# Patient Record
Sex: MALE | URBAN - METROPOLITAN AREA
[De-identification: names, ages, dates, MRNs, and addresses within clinical notes are randomized per-mention and may not be internally consistent; named-entity substitution may affect disease eponyms.]

---

## 2019-05-17 ENCOUNTER — INPATIENT (INPATIENT)
Facility: HOSPITAL | Age: 69
LOS: 2 days | Discharge: ANOTHER HOSPITAL | DRG: 282 | End: 2019-05-20
Attending: INTERNAL MEDICINE | Admitting: INTERNAL MEDICINE
Payer: COMMERCIAL

## 2019-05-17 VITALS
DIASTOLIC BLOOD PRESSURE: 69 MMHG | SYSTOLIC BLOOD PRESSURE: 147 MMHG | HEIGHT: 66 IN | HEART RATE: 61 BPM | OXYGEN SATURATION: 98 % | WEIGHT: 167.99 LBS | RESPIRATION RATE: 20 BRPM | TEMPERATURE: 98 F

## 2019-05-17 DIAGNOSIS — Z29.9 ENCOUNTER FOR PROPHYLACTIC MEASURES, UNSPECIFIED: ICD-10-CM

## 2019-05-17 DIAGNOSIS — E11.9 TYPE 2 DIABETES MELLITUS WITHOUT COMPLICATIONS: ICD-10-CM

## 2019-05-17 DIAGNOSIS — Z95.1 PRESENCE OF AORTOCORONARY BYPASS GRAFT: Chronic | ICD-10-CM

## 2019-05-17 DIAGNOSIS — I20.0 UNSTABLE ANGINA: ICD-10-CM

## 2019-05-17 DIAGNOSIS — R07.9 CHEST PAIN, UNSPECIFIED: ICD-10-CM

## 2019-05-17 DIAGNOSIS — I25.10 ATHEROSCLEROTIC HEART DISEASE OF NATIVE CORONARY ARTERY WITHOUT ANGINA PECTORIS: ICD-10-CM

## 2019-05-17 DIAGNOSIS — I10 ESSENTIAL (PRIMARY) HYPERTENSION: ICD-10-CM

## 2019-05-17 LAB
ALBUMIN SERPL ELPH-MCNC: 3.8 G/DL — SIGNIFICANT CHANGE UP (ref 3.5–5)
ALP SERPL-CCNC: 62 U/L — SIGNIFICANT CHANGE UP (ref 40–120)
ALT FLD-CCNC: 90 U/L DA — HIGH (ref 10–60)
ANION GAP SERPL CALC-SCNC: 7 MMOL/L — SIGNIFICANT CHANGE UP (ref 5–17)
AST SERPL-CCNC: 66 U/L — HIGH (ref 10–40)
BILIRUB SERPL-MCNC: 0.5 MG/DL — SIGNIFICANT CHANGE UP (ref 0.2–1.2)
BUN SERPL-MCNC: 16 MG/DL — SIGNIFICANT CHANGE UP (ref 7–18)
CALCIUM SERPL-MCNC: 9.1 MG/DL — SIGNIFICANT CHANGE UP (ref 8.4–10.5)
CHLORIDE SERPL-SCNC: 108 MMOL/L — SIGNIFICANT CHANGE UP (ref 96–108)
CO2 SERPL-SCNC: 25 MMOL/L — SIGNIFICANT CHANGE UP (ref 22–31)
CREAT SERPL-MCNC: 1.07 MG/DL — SIGNIFICANT CHANGE UP (ref 0.5–1.3)
GLUCOSE BLDC GLUCOMTR-MCNC: 107 MG/DL — HIGH (ref 70–99)
GLUCOSE BLDC GLUCOMTR-MCNC: 147 MG/DL — HIGH (ref 70–99)
GLUCOSE SERPL-MCNC: 102 MG/DL — HIGH (ref 70–99)
HCT VFR BLD CALC: 38.5 % — LOW (ref 39–50)
HGB BLD-MCNC: 12.9 G/DL — LOW (ref 13–17)
MCHC RBC-ENTMCNC: 30.1 PG — SIGNIFICANT CHANGE UP (ref 27–34)
MCHC RBC-ENTMCNC: 33.5 GM/DL — SIGNIFICANT CHANGE UP (ref 32–36)
MCV RBC AUTO: 89.7 FL — SIGNIFICANT CHANGE UP (ref 80–100)
NRBC # BLD: 0 /100 WBCS — SIGNIFICANT CHANGE UP (ref 0–0)
PLATELET # BLD AUTO: 175 K/UL — SIGNIFICANT CHANGE UP (ref 150–400)
POTASSIUM SERPL-MCNC: 4.8 MMOL/L — SIGNIFICANT CHANGE UP (ref 3.5–5.3)
POTASSIUM SERPL-SCNC: 4.8 MMOL/L — SIGNIFICANT CHANGE UP (ref 3.5–5.3)
PROT SERPL-MCNC: 7.4 G/DL — SIGNIFICANT CHANGE UP (ref 6–8.3)
RBC # BLD: 4.29 M/UL — SIGNIFICANT CHANGE UP (ref 4.2–5.8)
RBC # FLD: 12.5 % — SIGNIFICANT CHANGE UP (ref 10.3–14.5)
SODIUM SERPL-SCNC: 140 MMOL/L — SIGNIFICANT CHANGE UP (ref 135–145)
TROPONIN I SERPL-MCNC: 0.06 NG/ML — HIGH (ref 0–0.04)
TROPONIN I SERPL-MCNC: 0.21 NG/ML — HIGH (ref 0–0.04)
WBC # BLD: 9.07 K/UL — SIGNIFICANT CHANGE UP (ref 3.8–10.5)
WBC # FLD AUTO: 9.07 K/UL — SIGNIFICANT CHANGE UP (ref 3.8–10.5)

## 2019-05-17 PROCEDURE — 71046 X-RAY EXAM CHEST 2 VIEWS: CPT | Mod: 26

## 2019-05-17 PROCEDURE — 99285 EMERGENCY DEPT VISIT HI MDM: CPT

## 2019-05-17 RX ORDER — LISINOPRIL 2.5 MG/1
5 TABLET ORAL DAILY
Refills: 0 | Status: DISCONTINUED | OUTPATIENT
Start: 2019-05-17 | End: 2019-05-20

## 2019-05-17 RX ORDER — NIFEDIPINE 30 MG
30 TABLET, EXTENDED RELEASE 24 HR ORAL DAILY
Refills: 0 | Status: DISCONTINUED | OUTPATIENT
Start: 2019-05-17 | End: 2019-05-20

## 2019-05-17 RX ORDER — FUROSEMIDE 40 MG
40 TABLET ORAL DAILY
Refills: 0 | Status: DISCONTINUED | OUTPATIENT
Start: 2019-05-17 | End: 2019-05-20

## 2019-05-17 RX ORDER — CARVEDILOL PHOSPHATE 80 MG/1
25 CAPSULE, EXTENDED RELEASE ORAL EVERY 12 HOURS
Refills: 0 | Status: DISCONTINUED | OUTPATIENT
Start: 2019-05-17 | End: 2019-05-20

## 2019-05-17 RX ORDER — ISOSORBIDE MONONITRATE 60 MG/1
120 TABLET, EXTENDED RELEASE ORAL DAILY
Refills: 0 | Status: DISCONTINUED | OUTPATIENT
Start: 2019-05-17 | End: 2019-05-20

## 2019-05-17 RX ORDER — NITROGLYCERIN 6.5 MG
0.4 CAPSULE, EXTENDED RELEASE ORAL
Refills: 0 | Status: COMPLETED | OUTPATIENT
Start: 2019-05-17 | End: 2019-05-17

## 2019-05-17 RX ORDER — INSULIN LISPRO 100/ML
VIAL (ML) SUBCUTANEOUS
Refills: 0 | Status: DISCONTINUED | OUTPATIENT
Start: 2019-05-17 | End: 2019-05-20

## 2019-05-17 RX ORDER — TICAGRELOR 90 MG/1
60 TABLET ORAL
Refills: 0 | Status: DISCONTINUED | OUTPATIENT
Start: 2019-05-17 | End: 2019-05-20

## 2019-05-17 RX ORDER — DOCUSATE SODIUM 100 MG
100 CAPSULE ORAL DAILY
Refills: 0 | Status: DISCONTINUED | OUTPATIENT
Start: 2019-05-17 | End: 2019-05-20

## 2019-05-17 RX ORDER — FAMOTIDINE 10 MG/ML
20 INJECTION INTRAVENOUS
Refills: 0 | Status: DISCONTINUED | OUTPATIENT
Start: 2019-05-17 | End: 2019-05-20

## 2019-05-17 RX ORDER — ASPIRIN/CALCIUM CARB/MAGNESIUM 324 MG
81 TABLET ORAL DAILY
Refills: 0 | Status: DISCONTINUED | OUTPATIENT
Start: 2019-05-17 | End: 2019-05-20

## 2019-05-17 RX ORDER — ENOXAPARIN SODIUM 100 MG/ML
40 INJECTION SUBCUTANEOUS DAILY
Refills: 0 | Status: DISCONTINUED | OUTPATIENT
Start: 2019-05-17 | End: 2019-05-18

## 2019-05-17 RX ORDER — ATORVASTATIN CALCIUM 80 MG/1
40 TABLET, FILM COATED ORAL AT BEDTIME
Refills: 0 | Status: DISCONTINUED | OUTPATIENT
Start: 2019-05-17 | End: 2019-05-20

## 2019-05-17 RX ADMIN — Medication 0.4 MILLIGRAM(S): at 23:19

## 2019-05-17 RX ADMIN — Medication 0.4 MILLIGRAM(S): at 22:03

## 2019-05-17 RX ADMIN — ATORVASTATIN CALCIUM 40 MILLIGRAM(S): 80 TABLET, FILM COATED ORAL at 22:33

## 2019-05-17 RX ADMIN — Medication 0.4 MILLIGRAM(S): at 22:40

## 2019-05-17 NOTE — H&P ADULT - NSHPPHYSICALEXAM_GEN_ALL_CORE
General - NAD, sitting up in bed, well groomed  ENT - Nonicteric sclerae, PERRLA, EOMI. Oropharynx clear. Moist mucous membranes. Conjunctivae appear well perfused.   Neck - No noticeable or palpable swelling, redness or rash around throat or on face  Cardiovascular - RRR no JVD, no carotid bruits  Lungs - Clear to ascultation, no use of accessory muscles, no crackles or wheezes.  Skin - No rashes, skin warm and dry, no erythematous areas  Abdomen - Normal bowel sounds, abdomen soft and nontender  Extremities - No edema, cyanosis or clubbing  Musculo Skeletal - 5/5 strength, normal range of motion, no swollen or erythematous  joints.  Neurological – Alert and oriented x 3

## 2019-05-17 NOTE — H&P ADULT - PROBLEM SELECTOR PLAN 4
History of Coronary Artery Disease on Aspirin and Lipitor  - C/W Aspirin  - C/W Lipitor  - C/W Alanna

## 2019-05-17 NOTE — ED PROVIDER NOTE - CLINICAL SUMMARY MEDICAL DECISION MAKING FREE TEXT BOX
pt with hx of CAD, HTN< DM, HIGH CHOL with cp at rest while her for endosopy  will get cxr, labs, ekg, trop most likely admit

## 2019-05-17 NOTE — H&P ADULT - ASSESSMENT
68 y/o male from home with PMhx of HTN, HLD, CAD, DM, Unstable angina that was brought to the ED from endoscopy today in [facility] due to chest pain. Patient refers that the chest pain is on off does not radiate. This usually constant since the past few years and usually happens on exertion.  Denies any nausea vomiting, no fever or chills,  No  SOB, palpitations, dizziness, headache, cough, wheezing, joint pain or swelling, fever, chills.     Work up was done in the ED. Chest X-ray showing : Sternotomy wires noted. No consolidation or pleural effusion. Right upper   quadrant surgical clips present. Atelectasis lung bases.  Troponin elevated to 0.058. Patient comfortable not complaining of any current chest pain. 68 y/o male from home with PMhx of HTN, HLD, CAD, DM, Unstable angina that was brought to the ED from endoscopy today in [facility] due to chest pain. Patient refers that the chest pain is on off does not radiate. This usually constant since the past few years and usually happens on exertion.  Denies any nausea vomiting, no fever or chills,  No  SOB, palpitations, dizziness, headache, cough, wheezing, joint pain or swelling, fever, chills.     Work up was done in the ED. Chest X-ray showing : Sternotomy wires noted. No consolidation or pleural effusion. Right upper   quadrant surgical clips present. Atelectasis lung bases.  Troponin elevated to 0.058. Patient comfortable not complaining of any current chest pain.     Outpatient Cardiologist  [phone] Dr Hu

## 2019-05-17 NOTE — PATIENT PROFILE ADULT - STATED REASON FOR ADMISSION
Went for endoscopy and pt start having chest pain. Could not have the endo because of the chest pain. Pt was taking to the ED.

## 2019-05-17 NOTE — ED ADULT NURSE REASSESSMENT NOTE - NS ED NURSE REASSESS COMMENT FT1
Received pt form DIANA Blue, pt is observed sitting in bed, breathing room air, in no respiratory distress at time of assessment. Pt is A&O x3, able to make needs known, denies any distress/discomfort at this time. Pt ambulates on own, skin intact, right hand #18Ga in place and left AC #22 GA in place. No meds to be administered at this time. Admitted to [facility], on Box [address], sinus rhythm noted HR 65 at this time. Report given to DIANA Oliva by DIANA Juarez for [address]. Pt transported to floor on stretcher and monitor alongside son.

## 2019-05-17 NOTE — H&P ADULT - PROBLEM SELECTOR PLAN 2
-Patient takes at home Basaglar Insulin    -Continue with Insulin Sliding Scale   -Accucheck: Before meals and at bedtime

## 2019-05-17 NOTE — ED PROVIDER NOTE - PROGRESS NOTE DETAILS
Scribe HPI: 70 y/o M with a significant PMHx of  DM, HLD, HTN, and CABG and no significant PSHx presents to the ED with c/o L-sided chest pain that radiates to the L arm and neck x today. Pt states he was upstairs having an endoscopy done when the sxs began. Pt notes he is  currently asymptomatic in the ED. Pt denies diaphoresis, nausea, vomiting, or any other complaints. NKDA.     Scribe MDM: 70 y/o M with a PMHx of DM, HLD, HTN, and CABG presents to the ED with L-sided chest pain that radiates to the L arm and neck x today. Will check EKG, do cardiac w/u, and reassess. Pt has positive troponin, but unable to reach cardiologist to verify previous EKG. Will admit for unstable angina.

## 2019-05-17 NOTE — H&P ADULT - NSICDXPASTMEDICALHX_GEN_ALL_CORE_FT
PAST MEDICAL HISTORY:  CAD (coronary artery disease)     Diabetes     HLD (hyperlipidemia)     Hypertension

## 2019-05-17 NOTE — H&P ADULT - PROBLEM SELECTOR PLAN 5
IMPROVE VTE score: 2  Will manage with: Lovenox S/C    [ ] Previous VTE                                    3  [ ] Thrombophilia                                  2  [ ] Lower limb paralysis                        2  (unable to hold up >15 seconds)    [ ] Current Cancer (within 6 months)        2   [x] Immobilization > 24 hrs                    1  [ ] ICU/CCU stay > 24 hrs                      1  [x] Age > 60                                         1

## 2019-05-17 NOTE — H&P ADULT - PROBLEM SELECTOR PLAN 1
-EKG showed RBBB  Management:  - ASA, Lipitor + BB  - Check Troponins: T1: 0.058 T2: 0.211 and T3  - Telemetry monitoring  - TTE ordered  - Cardiology consult Dr Quintanilla -EKG showed RBBB. No ST segment elevations.   Management:  - ASA, Lipitor + BB  - Check Troponins: T1: 0.058 T2: 0.211 and T3  - Telemetry monitoring  - TTE ordered  - Cardiology consult Dr Quintanilla -EKG showed RBBB. No ST segment elevations.   - Will manage as NSTEMI  Management:  - ASA, Lipitor + BB  - Check Troponins: T1: 0.058 T2: 0.211 and T3: 0.8  - Started on Heparin Drip  - Telemetry monitoring  - TTE ordered  - Cardiology consult Dr Salinas

## 2019-05-17 NOTE — ED ADULT NURSE NOTE - NSIMPLEMENTINTERV_GEN_ALL_ED
Implemented All Universal Safety Interventions:  [place] to call system. Call bell, personal items and telephone within reach. Instruct patient to call for assistance. Room bathroom lighting operational. Non-slip footwear when patient is off stretcher. Physically safe environment: no spills, clutter or unnecessary equipment. Stretcher in lowest position, wheels locked, appropriate side rails in place.

## 2019-05-17 NOTE — H&P ADULT - HISTORY OF PRESENT ILLNESS
68 y/o male from home with PMhx of HTN, HLD, CAD, DM, Unstable angina that was brought to the ED from endoscopy today in [facility] due to chest pain. Patient refers that the chest pain is on off does not radiate. This usually constant since the past few years and usually happens on exertion.  Denies any nausea vomiting, no fever or chills,  No  SOB, palpitations, dizziness, headache, cough, wheezing, joint pain or swelling, fever, chills. 70 y/o male from home with PMHx of HTN, HLD, CAD, DM, Unstable angina that was brought to the ED from endoscopy today in [facility] due to chest pain. Patient refers that the chest pain is on off does not radiate. This usually constant since the past few years and usually happens on exertion.  Denies any nausea vomiting, no fever or chills,  No  SOB, palpitations, dizziness, headache, cough, wheezing, joint pain or swelling, fever, chills.

## 2019-05-18 DIAGNOSIS — I21.4 NON-ST ELEVATION (NSTEMI) MYOCARDIAL INFARCTION: ICD-10-CM

## 2019-05-18 LAB
APTT BLD: 132 SEC — CRITICAL HIGH (ref 27.5–36.3)
APTT BLD: 178.9 SEC — CRITICAL HIGH (ref 27.5–36.3)
APTT BLD: 43.4 SEC — HIGH (ref 27.5–36.3)
CK MB BLD-MCNC: 10.2 % — HIGH (ref 0–3.5)
CK MB BLD-MCNC: 14.2 % — HIGH (ref 0–3.5)
CK MB CFR SERPL CALC: 11.8 NG/ML — HIGH (ref 0–3.6)
CK MB CFR SERPL CALC: 22.8 NG/ML — HIGH (ref 0–3.6)
CK SERPL-CCNC: 116 U/L — SIGNIFICANT CHANGE UP (ref 35–232)
CK SERPL-CCNC: 160 U/L — SIGNIFICANT CHANGE UP (ref 35–232)
GLUCOSE BLDC GLUCOMTR-MCNC: 119 MG/DL — HIGH (ref 70–99)
GLUCOSE BLDC GLUCOMTR-MCNC: 138 MG/DL — HIGH (ref 70–99)
GLUCOSE BLDC GLUCOMTR-MCNC: 150 MG/DL — HIGH (ref 70–99)
GLUCOSE BLDC GLUCOMTR-MCNC: 205 MG/DL — HIGH (ref 70–99)
HCT VFR BLD CALC: 44.2 % — SIGNIFICANT CHANGE UP (ref 39–50)
HCV AB S/CO SERPL IA: 0.08 S/CO — SIGNIFICANT CHANGE UP (ref 0–0.99)
HCV AB SERPL-IMP: SIGNIFICANT CHANGE UP
HGB BLD-MCNC: 14.5 G/DL — SIGNIFICANT CHANGE UP (ref 13–17)
INR BLD: 1.18 RATIO — HIGH (ref 0.88–1.16)
MCHC RBC-ENTMCNC: 29.7 PG — SIGNIFICANT CHANGE UP (ref 27–34)
MCHC RBC-ENTMCNC: 32.8 GM/DL — SIGNIFICANT CHANGE UP (ref 32–36)
MCV RBC AUTO: 90.4 FL — SIGNIFICANT CHANGE UP (ref 80–100)
NRBC # BLD: 0 /100 WBCS — SIGNIFICANT CHANGE UP (ref 0–0)
PLATELET # BLD AUTO: 198 K/UL — SIGNIFICANT CHANGE UP (ref 150–400)
PROTHROM AB SERPL-ACNC: 13.2 SEC — HIGH (ref 10–12.9)
RBC # BLD: 4.89 M/UL — SIGNIFICANT CHANGE UP (ref 4.2–5.8)
RBC # FLD: 12.7 % — SIGNIFICANT CHANGE UP (ref 10.3–14.5)
TROPONIN I SERPL-MCNC: 0.87 NG/ML — HIGH (ref 0–0.04)
TROPONIN I SERPL-MCNC: 10.9 NG/ML — HIGH (ref 0–0.04)
TROPONIN I SERPL-MCNC: 13.5 NG/ML — HIGH (ref 0–0.04)
TROPONIN I SERPL-MCNC: 7.21 NG/ML — HIGH (ref 0–0.04)
WBC # BLD: 8.21 K/UL — SIGNIFICANT CHANGE UP (ref 3.8–10.5)
WBC # FLD AUTO: 8.21 K/UL — SIGNIFICANT CHANGE UP (ref 3.8–10.5)

## 2019-05-18 RX ORDER — HEPARIN SODIUM 5000 [USP'U]/ML
INJECTION INTRAVENOUS; SUBCUTANEOUS
Qty: 25000 | Refills: 0 | Status: DISCONTINUED | OUTPATIENT
Start: 2019-05-18 | End: 2019-05-20

## 2019-05-18 RX ORDER — MORPHINE SULFATE 50 MG/1
2 CAPSULE, EXTENDED RELEASE ORAL ONCE
Refills: 0 | Status: DISCONTINUED | OUTPATIENT
Start: 2019-05-18 | End: 2019-05-18

## 2019-05-18 RX ADMIN — HEPARIN SODIUM 800 UNIT(S)/HR: 5000 INJECTION INTRAVENOUS; SUBCUTANEOUS at 20:38

## 2019-05-18 RX ADMIN — ISOSORBIDE MONONITRATE 120 MILLIGRAM(S): 60 TABLET, EXTENDED RELEASE ORAL at 11:34

## 2019-05-18 RX ADMIN — FAMOTIDINE 20 MILLIGRAM(S): 10 INJECTION INTRAVENOUS at 17:40

## 2019-05-18 RX ADMIN — CARVEDILOL PHOSPHATE 25 MILLIGRAM(S): 80 CAPSULE, EXTENDED RELEASE ORAL at 17:40

## 2019-05-18 RX ADMIN — TICAGRELOR 60 MILLIGRAM(S): 90 TABLET ORAL at 05:57

## 2019-05-18 RX ADMIN — MORPHINE SULFATE 2 MILLIGRAM(S): 50 CAPSULE, EXTENDED RELEASE ORAL at 01:32

## 2019-05-18 RX ADMIN — LISINOPRIL 5 MILLIGRAM(S): 2.5 TABLET ORAL at 05:57

## 2019-05-18 RX ADMIN — FAMOTIDINE 20 MILLIGRAM(S): 10 INJECTION INTRAVENOUS at 05:57

## 2019-05-18 RX ADMIN — Medication 100 MILLIGRAM(S): at 11:34

## 2019-05-18 RX ADMIN — Medication 30 MILLIGRAM(S): at 05:57

## 2019-05-18 RX ADMIN — CARVEDILOL PHOSPHATE 25 MILLIGRAM(S): 80 CAPSULE, EXTENDED RELEASE ORAL at 05:57

## 2019-05-18 RX ADMIN — MORPHINE SULFATE 2 MILLIGRAM(S): 50 CAPSULE, EXTENDED RELEASE ORAL at 02:04

## 2019-05-18 RX ADMIN — Medication 81 MILLIGRAM(S): at 11:34

## 2019-05-18 RX ADMIN — HEPARIN SODIUM 0 UNIT(S)/HR: 5000 INJECTION INTRAVENOUS; SUBCUTANEOUS at 19:29

## 2019-05-18 RX ADMIN — ATORVASTATIN CALCIUM 40 MILLIGRAM(S): 80 TABLET, FILM COATED ORAL at 22:47

## 2019-05-18 RX ADMIN — TICAGRELOR 60 MILLIGRAM(S): 90 TABLET ORAL at 17:40

## 2019-05-18 RX ADMIN — HEPARIN SODIUM 1100 UNIT(S)/HR: 5000 INJECTION INTRAVENOUS; SUBCUTANEOUS at 12:35

## 2019-05-18 RX ADMIN — Medication 40 MILLIGRAM(S): at 05:57

## 2019-05-18 RX ADMIN — HEPARIN SODIUM 1400 UNIT(S)/HR: 5000 INJECTION INTRAVENOUS; SUBCUTANEOUS at 03:28

## 2019-05-18 RX ADMIN — Medication 4: at 11:52

## 2019-05-18 RX ADMIN — HEPARIN SODIUM 0 UNIT(S)/HR: 5000 INJECTION INTRAVENOUS; SUBCUTANEOUS at 11:34

## 2019-05-18 NOTE — CONSULT NOTE ADULT - ATTENDING COMMENTS
Patient was seen and examined,interim events noted,labs and radiology studies reviewed.  Lyle Salinas MD,FACC.  [address] [place] [address].  [id number].  [phone]

## 2019-05-18 NOTE — CONSULT NOTE ADULT - ASSESSMENT
70 y/o male from home with PMhx of HTN, HLD, CAD, DM, Unstable angina that was brought to the ED from endoscopy today in [facility] due to chest pain.70 y/o male from home with PMhx of HTN, HLD, CAD, DM, Unstable angina that was brought to the ED from endoscopy today in [facility] due to chest pain.    Problem/Plan - 1:  ·  Problem: Chest pain ACS-NSTEMI  Plan: -EKG showed RBBB. No ST segment elevations.   Continue Heparin gtt  Will need cath        Problem/Plan - 2:  ·  Problem: Diabetes.  Plan: -Patient takes at home Basaglar Insulin    -Continue with Insulin Sliding Scale   -Accucheck: Before meals and at bedtime.     Problem/Plan - 3:  ·  Problem: Hypertension.  Plan: C/W Home BP medications.     Problem/Plan - 4:  ·  Problem: CAD (coronary artery disease).  Plan: History of Coronary Artery Disease on Aspirin and Lipitor  - C/W Aspirin  - C/W Lipitor  - C/W Alanna.

## 2019-05-18 NOTE — PROGRESS NOTE ADULT - SUBJECTIVE AND OBJECTIVE BOX
PGY1 Note discussed with supervising resident and primary attending.    Patient is a 69y old  Male who presents with a chief complaint of Chest Pain (17 May 2019 17:59)      INTERVAL HPI/OVERNIGHT EVENTS:    Mr. Gibbs is seen at the bedside. Denies chest pain. Cardiac enzyme spike noted this AM. Discussed with cardio - will continue heparin gtt. EKG this AM unchanged.    MEDICATIONS  (STANDING):  aspirin  chewable 81 milliGRAM(s) Oral daily  atorvastatin 40 milliGRAM(s) Oral at bedtime  carvedilol 25 milliGRAM(s) Oral every 12 hours  docusate sodium 100 milliGRAM(s) Oral daily  famotidine    Tablet 20 milliGRAM(s) Oral two times a day  furosemide    Tablet 40 milliGRAM(s) Oral daily  heparin  Infusion.  Unit(s)/Hr (14 mL/Hr) IV Continuous <Continuous>  insulin lispro (HumaLOG) corrective regimen sliding scale   SubCutaneous Before meals and at bedtime  isosorbide   mononitrate ER Tablet (IMDUR) 120 milliGRAM(s) Oral daily  lisinopril 5 milliGRAM(s) Oral daily  NIFEdipine XL 30 milliGRAM(s) Oral daily  ticagrelor 60 milliGRAM(s) Oral two times a day    MEDICATIONS  (PRN):      Allergies    No Known Allergies    Intolerances        REVIEW OF SYSTEMS:  CONSTITUTIONAL: No fever, weight loss, or fatigue  RESPIRATORY: No cough, wheezing, chills or hemoptysis; No shortness of breath  CARDIOVASCULAR: No chest pain, palpitations, dizziness, or leg swelling  GASTROINTESTINAL: No abdominal or epigastric pain. No nausea, vomiting, or hematemesis; No diarrhea or constipation. No melena or hematochezia.  NEUROLOGICAL: No headaches, memory loss, loss of strength, numbness, or tremors  SKIN: No itching, burning, rashes, or lesions     Vital Signs Last 24 Hrs  T(C): 36.5 (18 May 2019 07:28), Max: 37.2 (17 May 2019 15:26)  T(F): 97.7 (18 May 2019 07:28), Max: 99 (17 May 2019 15:26)  HR: 56 (18 May 2019 07:28) (56 - 71)  BP: 118/80 (18 May 2019 07:28) (118/80 - 152/68)  BP(mean): --  RR: 16 (18 May 2019 07:28) (16 - 20)  SpO2: 100% (18 May 2019 07:28) (98% - 100%)    PHYSICAL EXAM:  GENERAL: NAD  HEAD:  Atraumatic, Normocephalic  EYES: EOMI, PERRLA, conjunctiva and sclera clear  NECK: Supple, No JVD, Normal thyroid  CHEST/LUNG: Clear to percussion bilaterally; No rales, rhonchi, wheezing, or rubs  HEART: Regular rate and rhythm; No murmurs, rubs, or gallops  ABDOMEN: Soft, Nontender, Nondistended; Bowel sounds present  NERVOUS SYSTEM:  Alert & Oriented X3, Good concentration; Motor Strength 5/5 B/L   EXTREMITIES:  2+ Peripheral Pulses, No clubbing, cyanosis, or edema    LABS:                        14.5   8.21  )-----------( 198      ( 18 May 2019 10:06 )             44.2     05-17    140  |  108  |  16  ----------------------------<  102<H>  4.8   |  25  |  1.07    Ca    9.1      17 May 2019 12:25    TPro  7.4  /  Alb  3.8  /  TBili  0.5  /  DBili  x   /  AST  66<H>  /  ALT  90<H>  /  AlkPhos  62  05-17    PT/INR - ( 18 May 2019 02:53 )   PT: 13.2 sec;   INR: 1.18 ratio         PTT - ( 18 May 2019 02:53 )  PTT:43.4 sec    CAPILLARY BLOOD GLUCOSE      POCT Blood Glucose.: 150 mg/dL (18 May 2019 07:47)  POCT Blood Glucose.: 107 mg/dL (17 May 2019 21:33)  POCT Blood Glucose.: 147 mg/dL (17 May 2019 16:37)      RADIOLOGY & ADDITIONAL TESTS:    Imaging Personally Reviewed:  [ X] YES  [ ] NO    Consultant(s) Notes Reviewed:  [X ] YES  [ ] NO

## 2019-05-18 NOTE — PROGRESS NOTE ADULT - ASSESSMENT
68 y/o male from home with PMhx of HTN, HLD, CAD, DM, Unstable angina that was brought to the ED from endoscopy today in [facility] due to chest pain. Patient refers that the chest pain is on off does not radiate. This usually constant since the past few years and usually happens on exertion.  Denies any nausea vomiting, no fever or chills,  No  SOB, palpitations, dizziness, headache, cough, wheezing, joint pain or swelling, fever, chills.     Outpatient Cardiologist   Dr Hu

## 2019-05-18 NOTE — CONSULT NOTE ADULT - SUBJECTIVE AND OBJECTIVE BOX
CHIEF COMPLAINT:Chest pain    HPI:-68 y/o male from home with PMHx of HTN, HLD, CAD, T2DM, Unstable Angina that was brought to the ED from endoscopy today in [facility] due to chest pain. Patient refers that the chest pain is on off does not radiate. This usually constant since the past few years and usually happens on exertion.  Denies any nausea vomiting, no fever or chills,  No  SOB, palpitations, dizziness, headache, cough, wheezing, joint pain or swelling, fever, chills.     PAST MEDICAL & SURGICAL HISTORY:  HLD (hyperlipidemia)  CAD (coronary artery disease)  Diabetes  Hypertension  S/P CABG (coronary artery bypass graft)      MEDICATIONS  (STANDING):  aspirin  chewable 81 milliGRAM(s) Oral daily  atorvastatin 40 milliGRAM(s) Oral at bedtime  carvedilol 25 milliGRAM(s) Oral every 12 hours  docusate sodium 100 milliGRAM(s) Oral daily  famotidine    Tablet 20 milliGRAM(s) Oral two times a day  furosemide    Tablet 40 milliGRAM(s) Oral daily  heparin  Infusion.  Unit(s)/Hr (14 mL/Hr) IV Continuous <Continuous>  insulin lispro (HumaLOG) corrective regimen sliding scale   SubCutaneous Before meals and at bedtime  isosorbide   mononitrate ER Tablet (IMDUR) 120 milliGRAM(s) Oral daily  lisinopril 5 milliGRAM(s) Oral daily  NIFEdipine XL 30 milliGRAM(s) Oral daily  ticagrelor 60 milliGRAM(s) Oral two times a day    MEDICATIONS  (PRN):      FAMILY HISTORY:  No pertinent family history in first degree relatives    No family history of premature coronary artery disease or sudden cardiac death    SOCIAL HISTORY:  Smoking-Former Smoker  Alcohol-Denies  Ilicit Drug use-Denies    REVIEW OF SYSTEMS:  Constitutional: [ ] fever, [ ]weight loss, [ ]fatigue Activity [ ] Bedbound,[ ] Ambulates [ ] Unassisted[ ] Cane/Walker [ ] Assistence.  Eyes: [ ] visual changes  Respiratory: [ ]shortness of breath;  [ ] cough, [ ]wheezing, [ ]chills, [ ]hemoptysis  Cardiovascular: [x ] chest pain, [ ]palpitations, [ ]dizziness,  [ ]leg swelling[ ]orthopnea [ ]PND  Gastrointestinal: [ ] abdominal pain, [ ]nausea, [ ]vomiting,  [ ]diarrhea,[ ]constipation  Genitourinary: [ ] dysuria, [ ] hematuria  Neurologic: [ ] headaches [ ] tremors[ ] weakness  Skin: [ ] itching, [ ]burning, [ ] rashes  Endocrine: [ ] heat or cold intolerance  Musculoskeletal: [ ] joint pain or swelling; [ ] muscle, back, or extremity pain  Psychiatric: [ ] depression, [ ]anxiety, [ ]mood swings, or [ ]difficulty sleeping  Hematologic: [ ] easy bruising, [ ] bleeding gums       [ x] All others negative	  [ ] Unable to obtain    Vital Signs Last 24 Hrs  T(C): 37.1 (18 May 2019 11:23), Max: 37.2 (17 May 2019 15:26)  T(F): 98.8 (18 May 2019 11:23), Max: 99 (17 May 2019 15:26)  HR: 66 (18 May 2019 11:23) (56 - 71)  BP: 118/54 (18 May 2019 11:23) (118/54 - 152/68)  RR: 16 (18 May 2019 11:23) (16 - 20)  SpO2: 100% (18 May 2019 11:23) (98% - 100%)  I&O's Summary      PHYSICAL EXAM:  General: No acute distress BMI-27.1  HEENT: EOMI, PERRL[ ] Icteric  Neck: Supple, No JVD  Lungs: Equal air entry bilaterally; [ ] Rales [x ] Rhonchi [ ] Wheezing  Heart: Regular rate and rhythm;[x ] Murmurs-  2 /6 [x ] Systolic [ ] Diastolic [ ] Radiation,No rubs, or gallops  Abdomen: Nontender, bowel sounds present  Extremities: No clubbing, cyanosis, or edema[ ] Calf tenderness  Nervous system:  Alert & Oriented X3, no focal deficits  Psychiatric: Normal affect  Skin: No rashes or lesions      LABS:  05-17    140  |  108  |  16  ----------------------------<  102<H>  4.8   |  25  |  1.07    Ca    9.1      17 May 2019 12:25    TPro  7.4  /  Alb  3.8  /  TBili  0.5  /  DBili  x   /  AST  66<H>  /  ALT  90<H>  /  AlkPhos  62  05-17    Creatinine Trend: 1.07<--                        14.5   8.21  )-----------( 198      ( 18 May 2019 10:06 )             44.2     PT/INR - ( 18 May 2019 02:53 )   PT: 13.2 sec;   INR: 1.18 ratio         PTT - ( 18 May 2019 10:44 )  PTT:178.9 sec    Lipid Panel:   Cardiac Enzymes: CARDIAC MARKERS ( 18 May 2019 07:06 )  10.900 ng/mL / x     / x     / x     / x      CARDIAC MARKERS ( 18 May 2019 01:29 )  0.873 ng/mL / x     / x     / x     / x      CARDIAC MARKERS ( 17 May 2019 18:05 )  0.211 ng/mL / x     / x     / x     / x      CARDIAC MARKERS ( 17 May 2019 12:25 )  0.058 ng/mL / x     / x     / x     / x            ECG [my interpretation]: Normal sinus rhythm  at 65 BPM  Incomplete right bundle branch block  Cannot rule out Anterior infarct , age undetermined  Marked ST abnormality, possible lateral subendocardial injury  Abnormal ECG

## 2019-05-18 NOTE — PROGRESS NOTE ADULT - PROBLEM SELECTOR PLAN 1
EKG showed RBBB without ST or T wave abnormalities   c/w asa, statin, Brilinta and Coreg  tropnin this AM nick to 10.9 - EKG unchanged and pt asymptomatic  c/w heparin gtt  c/w tele monitoring  echo shows mildly reduced EF (50-55%)  will await recent stress report   continue current management as per cardio  cardio - Dr. Salinas

## 2019-05-19 LAB
ANION GAP SERPL CALC-SCNC: 9 MMOL/L — SIGNIFICANT CHANGE UP (ref 5–17)
APTT BLD: 73.8 SEC — HIGH (ref 27.5–36.3)
APTT BLD: 74.6 SEC — HIGH (ref 27.5–36.3)
BASOPHILS # BLD AUTO: 0 K/UL — SIGNIFICANT CHANGE UP (ref 0–0.2)
BASOPHILS NFR BLD AUTO: 0 % — SIGNIFICANT CHANGE UP (ref 0–2)
BUN SERPL-MCNC: 20 MG/DL — HIGH (ref 7–18)
CALCIUM SERPL-MCNC: 9 MG/DL — SIGNIFICANT CHANGE UP (ref 8.4–10.5)
CHLORIDE SERPL-SCNC: 103 MMOL/L — SIGNIFICANT CHANGE UP (ref 96–108)
CHOLEST SERPL-MCNC: 122 MG/DL — SIGNIFICANT CHANGE UP (ref 10–199)
CK MB BLD-MCNC: 6.8 % — HIGH (ref 0–3.5)
CK MB CFR SERPL CALC: 6.3 NG/ML — HIGH (ref 0–3.6)
CK SERPL-CCNC: 92 U/L — SIGNIFICANT CHANGE UP (ref 35–232)
CO2 SERPL-SCNC: 25 MMOL/L — SIGNIFICANT CHANGE UP (ref 22–31)
CREAT SERPL-MCNC: 1.23 MG/DL — SIGNIFICANT CHANGE UP (ref 0.5–1.3)
EOSINOPHIL # BLD AUTO: 0.22 K/UL — SIGNIFICANT CHANGE UP (ref 0–0.5)
EOSINOPHIL NFR BLD AUTO: 3 % — SIGNIFICANT CHANGE UP (ref 0–6)
FOLATE SERPL-MCNC: 18.5 NG/ML — SIGNIFICANT CHANGE UP
GLUCOSE BLDC GLUCOMTR-MCNC: 120 MG/DL — HIGH (ref 70–99)
GLUCOSE BLDC GLUCOMTR-MCNC: 155 MG/DL — HIGH (ref 70–99)
GLUCOSE BLDC GLUCOMTR-MCNC: 163 MG/DL — HIGH (ref 70–99)
GLUCOSE BLDC GLUCOMTR-MCNC: 186 MG/DL — HIGH (ref 70–99)
GLUCOSE SERPL-MCNC: 140 MG/DL — HIGH (ref 70–99)
HCT VFR BLD CALC: 41.2 % — SIGNIFICANT CHANGE UP (ref 39–50)
HDLC SERPL-MCNC: 36 MG/DL — LOW
HGB BLD-MCNC: 14.1 G/DL — SIGNIFICANT CHANGE UP (ref 13–17)
LIPID PNL WITH DIRECT LDL SERPL: 59 MG/DL — SIGNIFICANT CHANGE UP
LYMPHOCYTES # BLD AUTO: 1.49 K/UL — SIGNIFICANT CHANGE UP (ref 1–3.3)
LYMPHOCYTES # BLD AUTO: 20 % — SIGNIFICANT CHANGE UP (ref 13–44)
MAGNESIUM SERPL-MCNC: 1.9 MG/DL — SIGNIFICANT CHANGE UP (ref 1.6–2.6)
MCHC RBC-ENTMCNC: 30.1 PG — SIGNIFICANT CHANGE UP (ref 27–34)
MCHC RBC-ENTMCNC: 34.2 GM/DL — SIGNIFICANT CHANGE UP (ref 32–36)
MCV RBC AUTO: 87.8 FL — SIGNIFICANT CHANGE UP (ref 80–100)
MONOCYTES # BLD AUTO: 1.56 K/UL — HIGH (ref 0–0.9)
MONOCYTES NFR BLD AUTO: 21 % — HIGH (ref 2–14)
NEUTROPHILS # BLD AUTO: 4.17 K/UL — SIGNIFICANT CHANGE UP (ref 1.8–7.4)
NEUTROPHILS NFR BLD AUTO: 56 % — SIGNIFICANT CHANGE UP (ref 43–77)
PHOSPHATE SERPL-MCNC: 3 MG/DL — SIGNIFICANT CHANGE UP (ref 2.5–4.5)
PLATELET # BLD AUTO: 166 K/UL — SIGNIFICANT CHANGE UP (ref 150–400)
POTASSIUM SERPL-MCNC: 3.6 MMOL/L — SIGNIFICANT CHANGE UP (ref 3.5–5.3)
POTASSIUM SERPL-SCNC: 3.6 MMOL/L — SIGNIFICANT CHANGE UP (ref 3.5–5.3)
RBC # BLD: 4.69 M/UL — SIGNIFICANT CHANGE UP (ref 4.2–5.8)
RBC # FLD: 12.5 % — SIGNIFICANT CHANGE UP (ref 10.3–14.5)
SODIUM SERPL-SCNC: 137 MMOL/L — SIGNIFICANT CHANGE UP (ref 135–145)
TOTAL CHOLESTEROL/HDL RATIO MEASUREMENT: 3.4 RATIO — SIGNIFICANT CHANGE UP (ref 3.4–9.6)
TRIGL SERPL-MCNC: 134 MG/DL — SIGNIFICANT CHANGE UP (ref 10–149)
TROPONIN I SERPL-MCNC: 4.51 NG/ML — HIGH (ref 0–0.04)
TSH SERPL-MCNC: 0.86 UU/ML — SIGNIFICANT CHANGE UP (ref 0.34–4.82)
VIT B12 SERPL-MCNC: 634 PG/ML — SIGNIFICANT CHANGE UP (ref 232–1245)
WBC # BLD: 7.44 K/UL — SIGNIFICANT CHANGE UP (ref 3.8–10.5)
WBC # FLD AUTO: 7.44 K/UL — SIGNIFICANT CHANGE UP (ref 3.8–10.5)

## 2019-05-19 RX ADMIN — Medication 2: at 08:08

## 2019-05-19 RX ADMIN — Medication 2: at 12:29

## 2019-05-19 RX ADMIN — ATORVASTATIN CALCIUM 40 MILLIGRAM(S): 80 TABLET, FILM COATED ORAL at 21:36

## 2019-05-19 RX ADMIN — TICAGRELOR 60 MILLIGRAM(S): 90 TABLET ORAL at 17:21

## 2019-05-19 RX ADMIN — HEPARIN SODIUM 800 UNIT(S)/HR: 5000 INJECTION INTRAVENOUS; SUBCUTANEOUS at 09:30

## 2019-05-19 RX ADMIN — HEPARIN SODIUM 800 UNIT(S)/HR: 5000 INJECTION INTRAVENOUS; SUBCUTANEOUS at 03:09

## 2019-05-19 RX ADMIN — Medication 40 MILLIGRAM(S): at 05:51

## 2019-05-19 RX ADMIN — FAMOTIDINE 20 MILLIGRAM(S): 10 INJECTION INTRAVENOUS at 05:50

## 2019-05-19 RX ADMIN — Medication 100 MILLIGRAM(S): at 12:29

## 2019-05-19 RX ADMIN — TICAGRELOR 60 MILLIGRAM(S): 90 TABLET ORAL at 05:51

## 2019-05-19 RX ADMIN — CARVEDILOL PHOSPHATE 25 MILLIGRAM(S): 80 CAPSULE, EXTENDED RELEASE ORAL at 17:21

## 2019-05-19 RX ADMIN — Medication 30 MILLIGRAM(S): at 05:50

## 2019-05-19 RX ADMIN — FAMOTIDINE 20 MILLIGRAM(S): 10 INJECTION INTRAVENOUS at 17:21

## 2019-05-19 RX ADMIN — Medication 81 MILLIGRAM(S): at 12:29

## 2019-05-19 RX ADMIN — Medication 2: at 21:36

## 2019-05-19 RX ADMIN — LISINOPRIL 5 MILLIGRAM(S): 2.5 TABLET ORAL at 05:51

## 2019-05-19 RX ADMIN — CARVEDILOL PHOSPHATE 25 MILLIGRAM(S): 80 CAPSULE, EXTENDED RELEASE ORAL at 05:51

## 2019-05-20 VITALS
HEART RATE: 53 BPM | OXYGEN SATURATION: 100 % | DIASTOLIC BLOOD PRESSURE: 49 MMHG | SYSTOLIC BLOOD PRESSURE: 116 MMHG | TEMPERATURE: 98 F | RESPIRATION RATE: 16 BRPM

## 2019-05-20 LAB
APTT BLD: 101.9 SEC — HIGH (ref 27.5–36.3)
APTT BLD: 45.7 SEC — HIGH (ref 27.5–36.3)
GLUCOSE BLDC GLUCOMTR-MCNC: 152 MG/DL — HIGH (ref 70–99)
GLUCOSE BLDC GLUCOMTR-MCNC: 228 MG/DL — HIGH (ref 70–99)
HCT VFR BLD CALC: 42.6 % — SIGNIFICANT CHANGE UP (ref 39–50)
HGB BLD-MCNC: 14.1 G/DL — SIGNIFICANT CHANGE UP (ref 13–17)
MCHC RBC-ENTMCNC: 29.8 PG — SIGNIFICANT CHANGE UP (ref 27–34)
MCHC RBC-ENTMCNC: 33.1 GM/DL — SIGNIFICANT CHANGE UP (ref 32–36)
MCV RBC AUTO: 90.1 FL — SIGNIFICANT CHANGE UP (ref 80–100)
NRBC # BLD: 0 /100 WBCS — SIGNIFICANT CHANGE UP (ref 0–0)
PLATELET # BLD AUTO: 181 K/UL — SIGNIFICANT CHANGE UP (ref 150–400)
RBC # BLD: 4.73 M/UL — SIGNIFICANT CHANGE UP (ref 4.2–5.8)
RBC # FLD: 12.8 % — SIGNIFICANT CHANGE UP (ref 10.3–14.5)
WBC # BLD: 10.7 K/UL — HIGH (ref 3.8–10.5)
WBC # FLD AUTO: 10.7 K/UL — HIGH (ref 3.8–10.5)

## 2019-05-20 PROCEDURE — 80048 BASIC METABOLIC PNL TOTAL CA: CPT

## 2019-05-20 PROCEDURE — 85730 THROMBOPLASTIN TIME PARTIAL: CPT

## 2019-05-20 PROCEDURE — 82550 ASSAY OF CK (CPK): CPT

## 2019-05-20 PROCEDURE — 71046 X-RAY EXAM CHEST 2 VIEWS: CPT

## 2019-05-20 PROCEDURE — 93005 ELECTROCARDIOGRAM TRACING: CPT

## 2019-05-20 PROCEDURE — 86803 HEPATITIS C AB TEST: CPT

## 2019-05-20 PROCEDURE — 36415 COLL VENOUS BLD VENIPUNCTURE: CPT

## 2019-05-20 PROCEDURE — 84484 ASSAY OF TROPONIN QUANT: CPT

## 2019-05-20 PROCEDURE — 82607 VITAMIN B-12: CPT

## 2019-05-20 PROCEDURE — 93306 TTE W/DOPPLER COMPLETE: CPT

## 2019-05-20 PROCEDURE — 83735 ASSAY OF MAGNESIUM: CPT

## 2019-05-20 PROCEDURE — 99285 EMERGENCY DEPT VISIT HI MDM: CPT | Mod: 25

## 2019-05-20 PROCEDURE — 85610 PROTHROMBIN TIME: CPT

## 2019-05-20 PROCEDURE — 80053 COMPREHEN METABOLIC PANEL: CPT

## 2019-05-20 PROCEDURE — 84100 ASSAY OF PHOSPHORUS: CPT

## 2019-05-20 PROCEDURE — 82746 ASSAY OF FOLIC ACID SERUM: CPT

## 2019-05-20 PROCEDURE — 82553 CREATINE MB FRACTION: CPT

## 2019-05-20 PROCEDURE — 82962 GLUCOSE BLOOD TEST: CPT

## 2019-05-20 PROCEDURE — 84443 ASSAY THYROID STIM HORMONE: CPT

## 2019-05-20 PROCEDURE — 85027 COMPLETE CBC AUTOMATED: CPT

## 2019-05-20 PROCEDURE — 80061 LIPID PANEL: CPT

## 2019-05-20 RX ORDER — ACETAMINOPHEN 500 MG
650 TABLET ORAL ONCE
Refills: 0 | Status: COMPLETED | OUTPATIENT
Start: 2019-05-20 | End: 2019-05-20

## 2019-05-20 RX ORDER — ATORVASTATIN CALCIUM 80 MG/1
1 TABLET, FILM COATED ORAL
Qty: 0 | Refills: 0 | DISCHARGE
Start: 2019-05-20

## 2019-05-20 RX ORDER — LISINOPRIL 2.5 MG/1
1 TABLET ORAL
Qty: 0 | Refills: 0 | DISCHARGE
Start: 2019-05-20

## 2019-05-20 RX ORDER — HEPARIN SODIUM 5000 [USP'U]/ML
0 INJECTION INTRAVENOUS; SUBCUTANEOUS
Qty: 0 | Refills: 0 | DISCHARGE
Start: 2019-05-20

## 2019-05-20 RX ADMIN — Medication 81 MILLIGRAM(S): at 12:09

## 2019-05-20 RX ADMIN — Medication 650 MILLIGRAM(S): at 13:00

## 2019-05-20 RX ADMIN — Medication 650 MILLIGRAM(S): at 12:42

## 2019-05-20 RX ADMIN — Medication 650 MILLIGRAM(S): at 05:31

## 2019-05-20 RX ADMIN — LISINOPRIL 5 MILLIGRAM(S): 2.5 TABLET ORAL at 05:33

## 2019-05-20 RX ADMIN — CARVEDILOL PHOSPHATE 25 MILLIGRAM(S): 80 CAPSULE, EXTENDED RELEASE ORAL at 05:33

## 2019-05-20 RX ADMIN — Medication 40 MILLIGRAM(S): at 05:32

## 2019-05-20 RX ADMIN — Medication 650 MILLIGRAM(S): at 04:25

## 2019-05-20 RX ADMIN — Medication 4: at 12:08

## 2019-05-20 RX ADMIN — Medication 30 MILLIGRAM(S): at 05:32

## 2019-05-20 RX ADMIN — FAMOTIDINE 20 MILLIGRAM(S): 10 INJECTION INTRAVENOUS at 05:32

## 2019-05-20 RX ADMIN — Medication 100 MILLIGRAM(S): at 12:09

## 2019-05-20 RX ADMIN — HEPARIN SODIUM 800 UNIT(S)/HR: 5000 INJECTION INTRAVENOUS; SUBCUTANEOUS at 14:04

## 2019-05-20 RX ADMIN — TICAGRELOR 60 MILLIGRAM(S): 90 TABLET ORAL at 05:32

## 2019-05-20 RX ADMIN — HEPARIN SODIUM 1000 UNIT(S)/HR: 5000 INJECTION INTRAVENOUS; SUBCUTANEOUS at 07:10

## 2019-05-20 NOTE — DISCHARGE NOTE NURSING/CASE MANAGEMENT/SOCIAL WORK - NSDCDPATPORTLINK_GEN_ALL_CORE
You can access the [facility] Patient Portal, offered by [facility], by registering with the following website: [email or web address]

## 2019-05-20 NOTE — DISCHARGE NOTE PROVIDER - NSDCCPCAREPLAN_GEN_ALL_CORE_FT
PRINCIPAL DISCHARGE DIAGNOSIS  Diagnosis: NSTEMI (non-ST elevated myocardial infarction)  Assessment and Plan of Treatment: You came in with chest pain, you were found to have elevated cardiac enzymes and EKG changes. You were started on heparin drip and you need cardiac catheterization. You are being transferred for further management and cath      SECONDARY DISCHARGE DIAGNOSES  Diagnosis: Hypertension  Assessment and Plan of Treatment: Please continue with your home medications    Diagnosis: CAD (coronary artery disease)  Assessment and Plan of Treatment: Please continue with your home medications    Diagnosis: Diabetes  Assessment and Plan of Treatment: Please continue with your home medications

## 2019-05-20 NOTE — DISCHARGE NOTE PROVIDER - HOSPITAL COURSE
68 y/o male from home with PMHx of HTN, HLD, CAD, DM, Unstable angina that was brought to the ED from endoscopy today in [facility] due to chest pain. Patient refers that the chest pain is on off does not radiate. This usually constant since the past few years and usually happens on exertion.  Denies any nausea vomiting, no fever or chills,  No  SOB, palpitations, dizziness, headache, cough, wheezing, joint pain or swelling, fever, chills. Pt was found to have levated cardiac enzymes with EKG changes, was started on heparin drip and was seen by cardiologist. Echo was done that showed normal EF, Grade 2 diastolic dysfucntion and lv hypertrophy. Pt needs cardiac catherterization and is being transferred for procedure.

## 2024-01-31 RX ORDER — CARVEDILOL PHOSPHATE 80 MG/1
0 CAPSULE, EXTENDED RELEASE ORAL
Qty: 0 | Refills: 0 | DISCHARGE

## 2024-01-31 RX ORDER — NIFEDIPINE 30 MG
0 TABLET, EXTENDED RELEASE 24 HR ORAL
Qty: 0 | Refills: 0 | DISCHARGE

## 2024-01-31 RX ORDER — FUROSEMIDE 40 MG
1 TABLET ORAL
Qty: 0 | Refills: 0 | DISCHARGE

## 2024-01-31 RX ORDER — ISOSORBIDE MONONITRATE 60 MG/1
1 TABLET, EXTENDED RELEASE ORAL
Qty: 0 | Refills: 0 | DISCHARGE

## 2024-01-31 RX ORDER — FUROSEMIDE 40 MG
0 TABLET ORAL
Qty: 0 | Refills: 0 | DISCHARGE

## 2024-01-31 RX ORDER — FAMOTIDINE 10 MG/ML
1 INJECTION INTRAVENOUS
Qty: 0 | Refills: 0 | DISCHARGE

## 2024-01-31 RX ORDER — ROSUVASTATIN CALCIUM 5 MG/1
0 TABLET ORAL
Qty: 0 | Refills: 0 | DISCHARGE

## 2024-01-31 RX ORDER — DOCUSATE SODIUM 100 MG
1 CAPSULE ORAL
Qty: 0 | Refills: 0 | DISCHARGE

## 2024-01-31 RX ORDER — ASPIRIN/CALCIUM CARB/MAGNESIUM 324 MG
1 TABLET ORAL
Qty: 0 | Refills: 0 | DISCHARGE

## 2024-01-31 RX ORDER — RAMIPRIL 5 MG
1 CAPSULE ORAL
Qty: 0 | Refills: 0 | DISCHARGE

## 2024-01-31 RX ORDER — ROSUVASTATIN CALCIUM 5 MG/1
1 TABLET ORAL
Qty: 0 | Refills: 0 | DISCHARGE

## 2024-01-31 RX ORDER — NIFEDIPINE 30 MG
1 TABLET, EXTENDED RELEASE 24 HR ORAL
Qty: 0 | Refills: 0 | DISCHARGE

## 2024-01-31 RX ORDER — EZETIMIBE 10 MG/1
1 TABLET ORAL
Qty: 0 | Refills: 0 | DISCHARGE

## 2024-01-31 RX ORDER — SITAGLIPTIN 50 MG/1
1 TABLET, FILM COATED ORAL
Qty: 0 | Refills: 0 | DISCHARGE

## 2024-01-31 RX ORDER — TICAGRELOR 90 MG/1
1 TABLET ORAL
Qty: 0 | Refills: 0 | DISCHARGE

## 2024-01-31 RX ORDER — FAMOTIDINE 10 MG/ML
0 INJECTION INTRAVENOUS
Qty: 0 | Refills: 0 | DISCHARGE

## 2024-01-31 RX ORDER — CARVEDILOL PHOSPHATE 80 MG/1
1 CAPSULE, EXTENDED RELEASE ORAL
Qty: 0 | Refills: 0 | DISCHARGE

## 2024-01-31 RX ORDER — INSULIN GLARGINE 100 [IU]/ML
0 INJECTION, SOLUTION SUBCUTANEOUS
Qty: 0 | Refills: 0 | DISCHARGE